# Patient Record
Sex: FEMALE | Race: WHITE | Employment: FULL TIME | URBAN - METROPOLITAN AREA
[De-identification: names, ages, dates, MRNs, and addresses within clinical notes are randomized per-mention and may not be internally consistent; named-entity substitution may affect disease eponyms.]

---

## 2020-12-08 ENCOUNTER — THERAPY VISIT (OUTPATIENT)
Dept: PHYSICAL THERAPY | Facility: CLINIC | Age: 25
End: 2020-12-08
Payer: COMMERCIAL

## 2020-12-08 DIAGNOSIS — M54.2 CERVICALGIA: ICD-10-CM

## 2020-12-08 DIAGNOSIS — R68.84 JAW PAIN: ICD-10-CM

## 2020-12-08 PROCEDURE — 97112 NEUROMUSCULAR REEDUCATION: CPT | Mod: GP | Performed by: PHYSICAL THERAPIST

## 2020-12-08 PROCEDURE — 97161 PT EVAL LOW COMPLEX 20 MIN: CPT | Mod: GP | Performed by: PHYSICAL THERAPIST

## 2020-12-08 NOTE — PROGRESS NOTES
Physical Therapy Initial Evaluation  December 8, 2020     Precautions/Restrictions/MD instructions: PT eval and treat.    Subjective:   Date of Onset: 2014  C/C: bilateral jaw pain, right greater than left extending from her TMJ, masseter, digastrics. Lateral right sided neck pain extending to her shoulder. Clicking and locking with opening  Quality of pain is aching. Pains are described as constant in nature. Pain is worse: on the go. Pain is rated 4/10.   History of symptoms: Pains began gradually as the result of unknown origins. Likely due to stress, braces and teeth clenching. Since onset, symptoms are improving. 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 She denies night pain,  significant current illness or recent hospital admission. She denies any regional implanted devices. She denies history of surgery in the area other than wisdom teeth removal in 2015 and cranial spenoid surgery  Regular exercise routine: varies- yoga, spin classes, kickboxing  Imaging: x-ray. Current occupational status: . Patient's goals are: decrease pain, improve tolerance to eating tough foods, reduce muscle tension, improve ability to open her mouth, improve tolerance to computer work and drawing. Return to MD:  PRN.       Objective:    Posture:   Facial Symmetry:   TMJ AROM:   Movement Degrees Joint noise Deviation Pain   Opening 25 clicking s-curve Present at right jaw   Left Lateral deviation  5      Right Lateral Deviation 5      Joint mobility:  Movement Movement Pain   Distraction WNL soreness and  clicking on the right     Cervical AROM: (Major, Moderate, Minimal or Nil loss)  Movement Loss Javier Mod Min Nil Pain   Protrusion   x     Flexion   x     Retraction   x     Extension   x     Left Rotation  x      Right Rotation    x    Left Side Bending   x     Right Side bending  x      Facial symmetry: Right sided lipoma above her zygomatic arch    Beighton scale: 5/9    Assessment/Plan:    The patient is a 25 year old female with chief complaint of jaw, neck and upper back pain.    The patient has the following significant findings with corresponding treatment plan.  Diagnosis 1:  TMJ, neck pain    Pain -  hot/cold therapy, manual therapy, splint/taping/bracing/orthotics, education, directional preference exercise and home program  Decreased ROM/flexibility - manual therapy and therapeutic exercise  Decreased strength - therapeutic exercise and therapeutic activities  Impaired balance - neuro re-education and therapeutic activities  Decreased proprioception - neuro re-education and therapeutic activities  Impaired muscle performance - neuro re-education  Decreased function - therapeutic activities  Impaired posture - neuro re-education        Therapy Evaluation Codes:   1) History comprised of:   Personal factors that impact the plan of care:      Anxiety, Overall behavior pattern and Time since onset of symptoms.    Comorbidity factors that impact the plan of care are:      Depression, Migraines/headaches and history of facial tumor and removal.     Medications impacting care: Anti-depressant, Anti-inflammatory, High blood pressure, Muscle relaxant and Pain.  2) Examination of Body Systems comprised of:   Body structures and functions that impact the plan of care:      Cervical spine, Head, Knee, Lumbar spine and Thoracic Spine.   Activity limitations that impact the plan of care are:      Cooking, Lifting, Reading/Computer work, Sitting, Working, Sleeping and chewing, yawning, talking, oral hygine .   Clinical  presentation characteristics are:    Stable/Uncomplicated.  3) Presentation comprised of:   Presentation scored as Low complexity with uncomplicated characteristics..  4) Decision-Making    Low complexity using standardized patient assessment instrument and/or measureable assessment of functional outcome.  Cumulative Therapy Evaluation is: Low complexity.    Previous and current functional limitations:  (See Goal Flow Sheet for this information)    Short term and Long term goals: (See Goal Flow Sheet for this information)     Communication ability:  Patient appears to be able to clearly communicate and understand verbal and written communication and follow directions correctly.  Treatment Explanation - The following has been discussed with the patient: RX ordered/plan of care, anticipated outcomes, and possible risks and side effects.  This patient would benefit from PT intervention to resume normal activities.   Rehab potential is good.    Frequency:  1 X week, once daily  Duration:  for 8 weeks  Discharge Plan: Achieve all LTGs, be independent in home treatment program, and reach maximal therapeutic benefit.    Please refer to the daily flowsheet for treatment today, total treatment time and time spent performing 1:1 timed codes.

## 2020-12-08 NOTE — LETTER
[facility] PHYSICAL THERAPY  [address] [place] [address]  [phone]    December 10, 2020    Re: Elisha Branham   :   1995  MRN:  [id number]   REFERRING PHYSICIAN:   Aneesh Quinones    [facility]    Date of Initial Evaluation:  20  Visits:  Rxs Used: 1  Reason for Referral:     Jaw pain  Cervicalgia        Physical Therapy Initial Evaluation  2020     Precautions/Restrictions/MD instructions: PT eval and treat.    Subjective:   Date of Onset:   C/C: bilateral jaw pain, right greater than left extending from her TMJ, masseter, digastrics. Lateral right sided neck pain extending to her shoulder. Clicking and locking with opening  Quality of pain is aching. Pains are described as constant in nature. Pain is worse: on the go. Pain is rated 4/10.   History of symptoms: Pains began gradually as the result of unknown origins. Likely due to stress, braces and teeth clenching. Since onset, symptoms are improving. 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 She denies night pain,  significant current illness or recent hospital admission. She denies any regional implanted devices. She denies history of surgery in the area other than wisdom teeth removal in  and cranial spenoid surgery  Regular exercise routine: varies- yoga, spin classes, kickboxing  Imaging: x-ray. Current occupational status: .  Patient's goals are: decrease pain, improve tolerance to eating tough foods, reduce muscle tension, improve ability to open her mouth, improve tolerance to computer work and drawing. Return to MD:  PRN.       Objective:    Posture:   Facial Symmetry:   TMJ AROM:   Movement Degrees Joint noise Deviation Pain   Opening 25 clicking s-curve Present at right jaw   Left Lateral deviation  5      Right Lateral Deviation 5        Joint mobility:  Movement Movement Pain   Distraction WNL soreness and clicking on the right     Cervical AROM: (Major, Moderate, Minimal or Nil loss)  Movement Loss Javier Mod Min Nil Pain   Protrusion   x     Flexion   x     Retraction   x     Extension   x     Left Rotation  x      Right Rotation    x    Left Side Bending   x     Right Side bending  x      Facial symmetry: Right sided lipoma above her zygomatic arch    Beighton scale:             Re: Elisha Branham   :   1995        Assessment/Plan:    The patient is a 25 year old female with chief complaint of jaw, neck and upper back pain.    The patient has the following significant findings with corresponding treatment plan.  Diagnosis 1:  TMJ, neck pain    Pain -  hot/cold therapy, manual therapy, splint/taping/bracing/orthotics, education, directional preference exercise and home program  Decreased ROM/flexibility - manual therapy and therapeutic exercise  Decreased strength - therapeutic exercise and therapeutic activities  Impaired balance - neuro re-education and therapeutic activities  Decreased proprioception - neuro re-education and therapeutic activities  Impaired muscle performance - neuro re-education  Decreased function - therapeutic activities  Impaired posture - neuro re-education      Therapy Evaluation Codes:   1) History comprised of:   Personal factors that impact the plan of care:      Anxiety, Overall behavior pattern and Time since onset of symptoms.    Comorbidity factors that impact the plan of care are:       Depression, Migraines/headaches and history of facial tumor and  removal.     Medications impacting care: Anti-depressant, Anti-inflammatory, High blood  pressure, Muscle relaxant and Pain.  2) Examination of Body Systems comprised of:   Body structures and functions that impact the plan of care:      Cervical spine, Head, Knee, Lumbar spine and Thoracic Spine.   Activity limitations that impact the plan of care are:      Cooking, Lifting, Reading/Computer work, Sitting, Working, Sleeping and  chewing, yawning, talking, oral hygine .   Clinical presentation characteristics are:    Stable/Uncomplicated.  3) Presentation comprised of:   Presentation scored as Low complexity with uncomplicated characteristics..  4) Decision-Making    Low complexity using standardized patient assessment instrument and/or  measureable assessment of functional outcome.      Cumulative Therapy Evaluation is: Low complexity.    Previous and current functional limitations:  (See Goal Flow Sheet for this information)    Short term and Long term goals: (See Goal Flow Sheet for this information)     Communication ability:  Patient appears to be able to clearly communicate and understand verbal and written communication and follow directions correctly.    Re: Elisha Branham   :   1995          Treatment Explanation - The following has been discussed with the patient: RX ordered/plan of care, anticipated outcomes, and possible risks and side effects.  This patient would benefit from PT intervention to resume normal activities.   Rehab potential is good.    Frequency:  1 X week, once daily  Duration:  for 8 weeks  Discharge Plan: Achieve all LTGs, be independent in home treatment program, and reach maximal therapeutic benefit.    Please refer to the daily flowsheet for treatment today, total treatment time and time spent performing 1:1 timed codes.         Thank you for your referral.    INQUIRIES  Therapist: Stephania Adair  DPT  [facility] PHYSICAL THERAPY [address] [place] [address]  Phone: [phone]  Fax: [phone]

## 2020-12-18 ENCOUNTER — THERAPY VISIT (OUTPATIENT)
Dept: PHYSICAL THERAPY | Facility: CLINIC | Age: 25
End: 2020-12-18
Payer: COMMERCIAL

## 2020-12-18 DIAGNOSIS — M54.2 CERVICALGIA: ICD-10-CM

## 2020-12-18 DIAGNOSIS — R68.84 JAW PAIN: ICD-10-CM

## 2020-12-18 PROCEDURE — 97140 MANUAL THERAPY 1/> REGIONS: CPT | Mod: GP | Performed by: PHYSICAL THERAPIST

## 2020-12-18 PROCEDURE — 97112 NEUROMUSCULAR REEDUCATION: CPT | Mod: GP | Performed by: PHYSICAL THERAPIST

## 2020-12-27 ENCOUNTER — HEALTH MAINTENANCE LETTER (OUTPATIENT)
Age: 25
End: 2020-12-27

## 2021-01-08 ENCOUNTER — THERAPY VISIT (OUTPATIENT)
Dept: PHYSICAL THERAPY | Facility: CLINIC | Age: 26
End: 2021-01-08
Payer: COMMERCIAL

## 2021-01-08 DIAGNOSIS — M54.2 CERVICALGIA: ICD-10-CM

## 2021-01-08 DIAGNOSIS — R68.84 JAW PAIN: ICD-10-CM

## 2021-01-08 PROCEDURE — 97112 NEUROMUSCULAR REEDUCATION: CPT | Mod: GP | Performed by: PHYSICAL THERAPIST

## 2021-01-08 PROCEDURE — 97140 MANUAL THERAPY 1/> REGIONS: CPT | Mod: GP | Performed by: PHYSICAL THERAPIST

## 2021-01-15 ENCOUNTER — THERAPY VISIT (OUTPATIENT)
Dept: PHYSICAL THERAPY | Facility: CLINIC | Age: 26
End: 2021-01-15
Payer: COMMERCIAL

## 2021-01-15 DIAGNOSIS — R68.84 JAW PAIN: ICD-10-CM

## 2021-01-15 DIAGNOSIS — M54.2 CERVICALGIA: ICD-10-CM

## 2021-01-15 PROCEDURE — 97140 MANUAL THERAPY 1/> REGIONS: CPT | Mod: GP | Performed by: PHYSICAL THERAPIST

## 2021-01-15 PROCEDURE — 97112 NEUROMUSCULAR REEDUCATION: CPT | Mod: GP | Performed by: PHYSICAL THERAPIST

## 2021-01-22 ENCOUNTER — THERAPY VISIT (OUTPATIENT)
Dept: PHYSICAL THERAPY | Facility: CLINIC | Age: 26
End: 2021-01-22
Payer: COMMERCIAL

## 2021-01-22 DIAGNOSIS — R68.84 JAW PAIN: ICD-10-CM

## 2021-01-22 DIAGNOSIS — M54.2 CERVICALGIA: ICD-10-CM

## 2021-01-22 PROCEDURE — 97140 MANUAL THERAPY 1/> REGIONS: CPT | Mod: GP | Performed by: PHYSICAL THERAPIST

## 2021-01-22 PROCEDURE — 97112 NEUROMUSCULAR REEDUCATION: CPT | Mod: GP | Performed by: PHYSICAL THERAPIST

## 2021-01-29 ENCOUNTER — THERAPY VISIT (OUTPATIENT)
Dept: PHYSICAL THERAPY | Facility: CLINIC | Age: 26
End: 2021-01-29
Payer: COMMERCIAL

## 2021-01-29 DIAGNOSIS — R68.84 JAW PAIN: Primary | ICD-10-CM

## 2021-01-29 DIAGNOSIS — M54.2 CERVICALGIA: ICD-10-CM

## 2021-01-29 PROCEDURE — 97140 MANUAL THERAPY 1/> REGIONS: CPT | Mod: GP | Performed by: PHYSICAL THERAPIST

## 2021-01-29 PROCEDURE — 97112 NEUROMUSCULAR REEDUCATION: CPT | Mod: GP | Performed by: PHYSICAL THERAPIST

## 2021-02-05 ENCOUNTER — THERAPY VISIT (OUTPATIENT)
Dept: PHYSICAL THERAPY | Facility: CLINIC | Age: 26
End: 2021-02-05
Payer: COMMERCIAL

## 2021-02-05 DIAGNOSIS — M54.2 CERVICALGIA: ICD-10-CM

## 2021-02-05 DIAGNOSIS — R68.84 JAW PAIN: ICD-10-CM

## 2021-02-05 PROCEDURE — 97140 MANUAL THERAPY 1/> REGIONS: CPT | Mod: GP | Performed by: PHYSICAL THERAPIST

## 2021-02-05 PROCEDURE — 97112 NEUROMUSCULAR REEDUCATION: CPT | Mod: GP | Performed by: PHYSICAL THERAPIST

## 2021-02-12 ENCOUNTER — THERAPY VISIT (OUTPATIENT)
Dept: PHYSICAL THERAPY | Facility: CLINIC | Age: 26
End: 2021-02-12
Payer: COMMERCIAL

## 2021-02-12 DIAGNOSIS — R68.84 JAW PAIN: ICD-10-CM

## 2021-02-12 DIAGNOSIS — M54.2 CERVICALGIA: ICD-10-CM

## 2021-02-12 PROCEDURE — 97140 MANUAL THERAPY 1/> REGIONS: CPT | Mod: GP | Performed by: PHYSICAL THERAPIST

## 2021-02-12 PROCEDURE — 97112 NEUROMUSCULAR REEDUCATION: CPT | Mod: GP | Performed by: PHYSICAL THERAPIST

## 2021-02-19 ENCOUNTER — THERAPY VISIT (OUTPATIENT)
Dept: PHYSICAL THERAPY | Facility: CLINIC | Age: 26
End: 2021-02-19
Payer: COMMERCIAL

## 2021-02-19 DIAGNOSIS — M54.2 CERVICALGIA: ICD-10-CM

## 2021-02-19 DIAGNOSIS — R68.84 JAW PAIN: ICD-10-CM

## 2021-02-19 PROCEDURE — 97140 MANUAL THERAPY 1/> REGIONS: CPT | Mod: GP | Performed by: PHYSICAL THERAPIST

## 2021-02-19 PROCEDURE — 97112 NEUROMUSCULAR REEDUCATION: CPT | Mod: GP | Performed by: PHYSICAL THERAPIST

## 2021-03-05 ENCOUNTER — THERAPY VISIT (OUTPATIENT)
Dept: PHYSICAL THERAPY | Facility: CLINIC | Age: 26
End: 2021-03-05
Payer: COMMERCIAL

## 2021-03-05 DIAGNOSIS — R68.84 JAW PAIN: ICD-10-CM

## 2021-03-05 DIAGNOSIS — M54.2 CERVICALGIA: ICD-10-CM

## 2021-03-05 PROCEDURE — 97140 MANUAL THERAPY 1/> REGIONS: CPT | Mod: GP | Performed by: PHYSICAL THERAPIST

## 2021-03-05 PROCEDURE — 97112 NEUROMUSCULAR REEDUCATION: CPT | Mod: GP | Performed by: PHYSICAL THERAPIST

## 2021-03-12 ENCOUNTER — THERAPY VISIT (OUTPATIENT)
Dept: PHYSICAL THERAPY | Facility: CLINIC | Age: 26
End: 2021-03-12
Payer: COMMERCIAL

## 2021-03-12 DIAGNOSIS — M54.2 CERVICALGIA: ICD-10-CM

## 2021-03-12 DIAGNOSIS — R68.84 JAW PAIN: ICD-10-CM

## 2021-03-12 PROCEDURE — 97112 NEUROMUSCULAR REEDUCATION: CPT | Mod: GP | Performed by: PHYSICAL THERAPIST

## 2021-03-12 PROCEDURE — 97110 THERAPEUTIC EXERCISES: CPT | Mod: GP | Performed by: PHYSICAL THERAPIST

## 2021-03-12 PROCEDURE — 97140 MANUAL THERAPY 1/> REGIONS: CPT | Mod: GP | Performed by: PHYSICAL THERAPIST

## 2021-03-12 NOTE — PROGRESS NOTES
PROGRESS  REPORT    Progress reporting period is from 12/8/20 to 3/12/21.       SUBJECTIVE  Subjective changes noted by patient: Silvia reports she she feels a 50-60% improvement in function. She is eating more easily but still has moderate difficulty eating crunchy breads. She is getting headaches 1-4 days per week, averaging 2. She experiences more symptoms when she is stressed.  Current pain level is 0/10 in her jaw, Headache 4-7/10 due to stress at work.     Previous pain level was   8/10.   Changes in function:  Yes (See Goal flowsheet attached for changes in current functional level)  Adverse reaction to treatment or activity: None    OBJECTIVE  Changes noted in objective findings:  Yes,     TMJ AROM:   Movement Degrees Joint noise Deviation Pain   Opening 55 none s-curve Present at right jaw   Left Lateral deviation  12         Right Lateral Deviation 10         Joint mobility:  Movement Movement Pain   Distraction WNL WNL      Cervical AROM: (Major, Moderate, Minimal or Nil loss)  Movement Loss Javier Mod Min Nil Pain   Protrusion     x       Flexion      x  Stretching     Retraction     x   Min stretching anterior neck     Extension      x      Left Rotation    x        Right Rotation       x Left sided tightness     Left Side Bending     x       Right Side bending     x       Facial symmetry: Right sided lipoma above her zygomatic arch - 50% reduction in density, smoother edges.         ASSESSMENT/PLAN  Updated problem list and treatment plan: Diagnosis 1:  TMJ, neck pain    Pain -  hot/cold therapy, manual therapy, splint/taping/bracing/orthotics, education, directional preference exercise and home program  Decreased ROM/flexibility - manual therapy and therapeutic exercise  Decreased strength - therapeutic exercise and therapeutic activities  Impaired balance - neuro re-education and therapeutic activities  Decreased proprioception - neuro re-education and therapeutic activities  Impaired muscle performance -  neuro re-education  Decreased function - therapeutic activities  Impaired posture - neuro re-education  STG/LTGs have been met or progress has been made towards goals:  Yes (See Goal flow sheet completed today.)  Assessment of Progress: The patient's condition is improving.  Self Management Plans:  Patient has been instructed in a home treatment program.  I have re-evaluated this patient and find that the nature, scope, duration and intensity of the therapy is appropriate for the medical condition of the patient.  Elisha continues to require the following intervention to meet STG and LTG's:  PT    Recommendations:  This patient would benefit from continued therapy.     Frequency:  1 X week, once daily  Duration:  for 3 weeks tapering to 2 X a month over 2 months        Please refer to the daily flowsheet for treatment today, total treatment time and time spent performing 1:1 timed codes.

## 2021-03-19 ENCOUNTER — THERAPY VISIT (OUTPATIENT)
Dept: PHYSICAL THERAPY | Facility: CLINIC | Age: 26
End: 2021-03-19
Payer: COMMERCIAL

## 2021-03-19 DIAGNOSIS — M54.2 CERVICALGIA: ICD-10-CM

## 2021-03-19 DIAGNOSIS — R68.84 JAW PAIN: ICD-10-CM

## 2021-03-19 PROCEDURE — 97140 MANUAL THERAPY 1/> REGIONS: CPT | Mod: GP | Performed by: PHYSICAL THERAPIST

## 2021-03-19 PROCEDURE — 97110 THERAPEUTIC EXERCISES: CPT | Mod: GP | Performed by: PHYSICAL THERAPIST

## 2021-03-19 PROCEDURE — 97112 NEUROMUSCULAR REEDUCATION: CPT | Mod: GP | Performed by: PHYSICAL THERAPIST

## 2021-03-26 ENCOUNTER — THERAPY VISIT (OUTPATIENT)
Dept: PHYSICAL THERAPY | Facility: CLINIC | Age: 26
End: 2021-03-26
Payer: COMMERCIAL

## 2021-03-26 DIAGNOSIS — R68.84 JAW PAIN: ICD-10-CM

## 2021-03-26 DIAGNOSIS — M54.2 CERVICALGIA: ICD-10-CM

## 2021-03-26 PROCEDURE — 97140 MANUAL THERAPY 1/> REGIONS: CPT | Mod: GP | Performed by: PHYSICAL THERAPIST

## 2021-03-26 PROCEDURE — 97112 NEUROMUSCULAR REEDUCATION: CPT | Mod: GP | Performed by: PHYSICAL THERAPIST

## 2021-03-26 PROCEDURE — 97110 THERAPEUTIC EXERCISES: CPT | Mod: GP | Performed by: PHYSICAL THERAPIST

## 2021-04-09 ENCOUNTER — THERAPY VISIT (OUTPATIENT)
Dept: PHYSICAL THERAPY | Facility: CLINIC | Age: 26
End: 2021-04-09
Payer: COMMERCIAL

## 2021-04-09 DIAGNOSIS — R68.84 JAW PAIN: ICD-10-CM

## 2021-04-09 DIAGNOSIS — M54.2 CERVICALGIA: ICD-10-CM

## 2021-04-09 PROCEDURE — 97112 NEUROMUSCULAR REEDUCATION: CPT | Mod: GP | Performed by: PHYSICAL THERAPIST

## 2021-04-09 PROCEDURE — 97110 THERAPEUTIC EXERCISES: CPT | Mod: GP | Performed by: PHYSICAL THERAPIST

## 2021-04-09 PROCEDURE — 97535 SELF CARE MNGMENT TRAINING: CPT | Mod: GP | Performed by: PHYSICAL THERAPIST

## 2021-04-23 ENCOUNTER — THERAPY VISIT (OUTPATIENT)
Dept: PHYSICAL THERAPY | Facility: CLINIC | Age: 26
End: 2021-04-23
Payer: COMMERCIAL

## 2021-04-23 DIAGNOSIS — R68.84 JAW PAIN: ICD-10-CM

## 2021-04-23 DIAGNOSIS — M54.2 CERVICALGIA: ICD-10-CM

## 2021-04-23 PROCEDURE — 97112 NEUROMUSCULAR REEDUCATION: CPT | Mod: GP | Performed by: PHYSICAL THERAPIST

## 2021-04-23 PROCEDURE — 97140 MANUAL THERAPY 1/> REGIONS: CPT | Mod: GP | Performed by: PHYSICAL THERAPIST

## 2021-04-23 PROCEDURE — 97110 THERAPEUTIC EXERCISES: CPT | Mod: GP | Performed by: PHYSICAL THERAPIST

## 2021-05-07 ENCOUNTER — THERAPY VISIT (OUTPATIENT)
Dept: PHYSICAL THERAPY | Facility: CLINIC | Age: 26
End: 2021-05-07
Payer: COMMERCIAL

## 2021-05-07 DIAGNOSIS — M54.2 CERVICALGIA: ICD-10-CM

## 2021-05-07 DIAGNOSIS — R68.84 JAW PAIN: ICD-10-CM

## 2021-05-07 PROCEDURE — 97140 MANUAL THERAPY 1/> REGIONS: CPT | Mod: GP | Performed by: PHYSICAL THERAPIST

## 2021-05-07 PROCEDURE — 97112 NEUROMUSCULAR REEDUCATION: CPT | Mod: GP | Performed by: PHYSICAL THERAPIST

## 2021-05-07 PROCEDURE — 97110 THERAPEUTIC EXERCISES: CPT | Mod: GP | Performed by: PHYSICAL THERAPIST

## 2021-05-21 ENCOUNTER — THERAPY VISIT (OUTPATIENT)
Dept: PHYSICAL THERAPY | Facility: CLINIC | Age: 26
End: 2021-05-21
Payer: COMMERCIAL

## 2021-05-21 DIAGNOSIS — R68.84 JAW PAIN: ICD-10-CM

## 2021-05-21 DIAGNOSIS — M54.2 CERVICALGIA: ICD-10-CM

## 2021-05-21 PROCEDURE — 97140 MANUAL THERAPY 1/> REGIONS: CPT | Mod: GP | Performed by: PHYSICAL THERAPIST

## 2021-05-21 PROCEDURE — 97112 NEUROMUSCULAR REEDUCATION: CPT | Mod: GP | Performed by: PHYSICAL THERAPIST

## 2021-06-04 ENCOUNTER — THERAPY VISIT (OUTPATIENT)
Dept: PHYSICAL THERAPY | Facility: CLINIC | Age: 26
End: 2021-06-04
Payer: COMMERCIAL

## 2021-06-04 DIAGNOSIS — R68.84 JAW PAIN: ICD-10-CM

## 2021-06-04 DIAGNOSIS — M54.2 CERVICALGIA: ICD-10-CM

## 2021-06-04 PROCEDURE — 97140 MANUAL THERAPY 1/> REGIONS: CPT | Mod: GP | Performed by: PHYSICAL THERAPIST

## 2021-06-04 PROCEDURE — 97112 NEUROMUSCULAR REEDUCATION: CPT | Mod: GP | Performed by: PHYSICAL THERAPIST

## 2021-07-02 ENCOUNTER — THERAPY VISIT (OUTPATIENT)
Dept: PHYSICAL THERAPY | Facility: CLINIC | Age: 26
End: 2021-07-02
Payer: COMMERCIAL

## 2021-07-02 DIAGNOSIS — M54.2 CERVICALGIA: ICD-10-CM

## 2021-07-02 DIAGNOSIS — R68.84 JAW PAIN: ICD-10-CM

## 2021-07-02 PROCEDURE — 97110 THERAPEUTIC EXERCISES: CPT | Mod: GP | Performed by: PHYSICAL THERAPIST

## 2021-07-02 PROCEDURE — 97112 NEUROMUSCULAR REEDUCATION: CPT | Mod: GP | Performed by: PHYSICAL THERAPIST

## 2021-07-02 PROCEDURE — 97140 MANUAL THERAPY 1/> REGIONS: CPT | Mod: GP | Performed by: PHYSICAL THERAPIST

## 2021-07-09 ENCOUNTER — OFFICE VISIT (OUTPATIENT)
Dept: URGENT CARE | Facility: URGENT CARE | Age: 26
End: 2021-07-09
Payer: COMMERCIAL

## 2021-07-09 ENCOUNTER — ANCILLARY PROCEDURE (OUTPATIENT)
Dept: GENERAL RADIOLOGY | Facility: CLINIC | Age: 26
End: 2021-07-09
Attending: INTERNAL MEDICINE
Payer: COMMERCIAL

## 2021-07-09 ENCOUNTER — TELEPHONE (OUTPATIENT)
Dept: FAMILY MEDICINE | Facility: CLINIC | Age: 26
End: 2021-07-09

## 2021-07-09 VITALS
TEMPERATURE: 98.8 F | HEART RATE: 60 BPM | BODY MASS INDEX: 25.76 KG/M2 | RESPIRATION RATE: 16 BRPM | HEIGHT: 68 IN | WEIGHT: 170 LBS | DIASTOLIC BLOOD PRESSURE: 70 MMHG | SYSTOLIC BLOOD PRESSURE: 104 MMHG

## 2021-07-09 DIAGNOSIS — M54.6 ACUTE MIDLINE THORACIC BACK PAIN: Primary | ICD-10-CM

## 2021-07-09 DIAGNOSIS — M54.2 NECK PAIN: ICD-10-CM

## 2021-07-09 DIAGNOSIS — M54.50 ACUTE BILATERAL LOW BACK PAIN WITHOUT SCIATICA: ICD-10-CM

## 2021-07-09 DIAGNOSIS — M62.830 SPASM OF BACK MUSCLES: ICD-10-CM
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
## 2021-07-09 PROCEDURE — 99204 OFFICE O/P NEW MOD 45 MIN: CPT | Performed by: INTERNAL MEDICINE

## 2021-07-09 PROCEDURE — 72070 X-RAY EXAM THORAC SPINE 2VWS: CPT | Performed by: RADIOLOGY

## 2021-07-09 RX ORDER — ESCITALOPRAM OXALATE 20 MG/1
TABLET ORAL
COMMUNITY
Start: 2021-05-21

## 2021-07-09 RX ORDER — SPIRONOLACTONE 100 MG/1
100 TABLET, FILM COATED ORAL DAILY
COMMUNITY

## 2021-07-09 RX ORDER — NAPROXEN 500 MG/1
500 TABLET ORAL 2 TIMES DAILY WITH MEALS
Qty: 28 TABLET | Refills: 0 | Status: SHIPPED | OUTPATIENT
Start: 2021-07-09 | End: 2021-07-23

## 2021-07-09 RX ORDER — CYCLOBENZAPRINE HCL 10 MG
10 TABLET ORAL 3 TIMES DAILY PRN
Qty: 20 TABLET | Refills: 0 | Status: SHIPPED | OUTPATIENT
Start: 2021-07-09 | End: 2021-07-16

## 2021-07-09 ASSESSMENT — MIFFLIN-ST. JEOR: SCORE: 1559.61

## 2021-07-09 NOTE — TELEPHONE ENCOUNTER
Fell down the stairs yesterday.  No LOC  Has a lot of pain today that she is not sure if it is muscular or bone pain.  Pain is decreasing as the day goes on, better this afternoon than it was this morning or last night.  Took a warm bath and that loosened things up.  Has not been seen at the clinic but has done PT at the [facility] in the building.  Patient has a history of having brain surgery 2 years ago and is concerned she may have done something though she denies hitting her head.  Patient will come to Urgent Care for evaluation.  Negar Ibrahim RN  [facility]

## 2021-07-09 NOTE — PATIENT INSTRUCTIONS
X-ray shows no obvious fracture  Radiology review pending    Please take naproxen 500 mg twice daily with food  May take muscle relaxant that you have at home   otherwise I did send a prescription for Flexeril 10 mg up to 3 times a day as needed    Ice to area 10 to 20 minutes 4 times a day    Rest  No heavy activities    If symptoms are changing or pain worsens you may go to the emergency room for evaluation.      Otherwise I would like you to have close follow-up with recheck next week.  May need Physical Therapy again

## 2021-07-09 NOTE — PROGRESS NOTES
ASSESSMENT AND PLAN:      ICD-10-CM    1. Acute midline thoracic back pain  M54.6 naproxen (NAPROSYN) 500 MG tablet     cyclobenzaprine (FLEXERIL) 10 MG tablet     XR Thoracic Spine 2 Views   2. Fall down stairs, initial encounter  W10.8XXA    3. Neck pain  M54.2    4. Acute bilateral low back pain without sciatica  M54.5    5. Spasm of back muscles  M62.830      Differential Diagnosis:  MS Injury Pain: sprain, fracture and contusion    PLAN:    May need repeat xray for fracture eval or CT imaging      Patient Instructions       X-ray shows no obvious fracture  Radiology review pending    Please take naproxen 500 mg twice daily with food  May take muscle relaxant that you have at home   otherwise I did send a prescription for Flexeril 10 mg up to 3 times a day as needed    Ice to area 10 to 20 minutes 4 times a day    Rest  No heavy activities    If symptoms are changing or pain worsens you may go to the emergency room for evaluation.      Otherwise I would like you to have close follow-up with recheck next week.  May need Physical Therapy again      No follow-ups on file.        Renee Navarrete MD  [facility] URGENT [facility]ARE    Subjective     Elisha Branham is a 26 year old who presents for Patient presents with:  Urgent Care  Musculoskeletal Problem: fell down stairs last night, very steep. 12 steps. Back and neck pain.     a new patient of [facility].    MS Injury/Pain    Onset of symptoms was last night  Context:       The injury happened while at home      Mechanism: fall ,   Fell down 12 steps last night  Slipped on wood steps and fell on back & hit every step with back of neck, upper back, bum      Patient experienced immediate pain    Current and Associated symptoms: Pain and Decreased range of motion  Denies numbness paresthesias weakness of hands  Aggravating Factors: twisting  Therapies to improve symptoms include: ibuprofen and hot water bath    She has a history of neck pain with reduced  "range of motion and just finished physical therapy for this last week  Now her neck feels stiff again    Hx mild concussion in past        Objective    /70   Pulse 60   Temp 98.8  F (37.1  C) (Oral)   Resp 16   Ht 1.727 m (5' 8\")   Wt 77.1 kg (170 lb)   LMP 07/02/2021   Breastfeeding No   BMI 25.85 kg/m    Physical Exam  Vitals signs reviewed.   Constitutional:       General: She is not in acute distress.     Appearance: Normal appearance. She is not ill-appearing, toxic-appearing or diaphoretic.   Musculoskeletal:      Comments: Full spine palpated    Palpation with guarding and   Pain over mid thoracic area just above bra line  With some swelling & ecchymosis    Also ecchymosis scattered near tail bone    Muscle spasm / pain noted with palpation of paraspinal tenderness    decreased range of motion neck     Neurological:      Mental Status: She is alert.            Xray - Reviewed and interpreted by me.  No fracture       "

## 2021-07-16 ENCOUNTER — OFFICE VISIT (OUTPATIENT)
Dept: FAMILY MEDICINE | Facility: CLINIC | Age: 26
End: 2021-07-16
Payer: COMMERCIAL

## 2021-07-16 VITALS
RESPIRATION RATE: 17 BRPM | WEIGHT: 177 LBS | OXYGEN SATURATION: 97 % | SYSTOLIC BLOOD PRESSURE: 111 MMHG | BODY MASS INDEX: 26.91 KG/M2 | HEART RATE: 65 BPM | DIASTOLIC BLOOD PRESSURE: 77 MMHG | TEMPERATURE: 98.6 F

## 2021-07-16 DIAGNOSIS — M54.50 ACUTE BILATERAL LOW BACK PAIN WITHOUT SCIATICA: ICD-10-CM

## 2021-07-16 DIAGNOSIS — F41.9 ANXIETY: ICD-10-CM
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
## 2021-07-16 DIAGNOSIS — S30.0XXS: ICD-10-CM

## 2021-07-16 DIAGNOSIS — M54.6 ACUTE BILATERAL THORACIC BACK PAIN: Primary | ICD-10-CM

## 2021-07-16 DIAGNOSIS — R20.2 PARESTHESIA OF RIGHT ARM: ICD-10-CM

## 2021-07-16 DIAGNOSIS — M54.2 CERVICALGIA: ICD-10-CM

## 2021-07-16 PROBLEM — H05.20 EXOPHTHALMOS OF RIGHT EYE: Status: ACTIVE | Noted: 2018-02-15

## 2021-07-16 PROBLEM — H05.20 EXOPHTHALMOS OF RIGHT EYE: Status: RESOLVED | Noted: 2018-02-15 | Resolved: 2021-07-16

## 2021-07-16 PROCEDURE — 99214 OFFICE O/P EST MOD 30 MIN: CPT | Performed by: FAMILY MEDICINE

## 2021-07-16 RX ORDER — TIZANIDINE 2 MG/1
2 TABLET ORAL
COMMUNITY

## 2021-07-16 NOTE — PATIENT INSTRUCTIONS
Doing better  No ct neck needed currently   If numbness in right arm gets worse then consider PT, MRI referral to Ortho spine  Keep taking naproxen, tizanidien as needed  Avoid alcohol  Soaking hot tub  Icing bruises on left back and right buttock  Can refer to ortho and pt if not better   Continue routine care with PCP Janene, therapy and regular primary  Continue care with [place] for hx of dysplasia as needed    Continue with being active  and modify activity that causes more pain   Ice then heat 20 min twice a day   Tylenol as needed  Over the counter pain creams to area  Follow up with primary if symptoms persist  If get worse such that loose control of bladder , bowels or difficulty walking go to Urgent care/ ER

## 2021-07-16 NOTE — PROGRESS NOTES
"    Assessment & Plan     Acute bilateral thoracic back pain  Acute bilateral low back pain without sciatica  Cervicalgia  Paresthesia of right arm  Traumatic ecchymosis of buttock, sequela  Fall down stairs, sequela  Anxiety  Doing better  No CT neck needed currently   If numbness in right arm gets worse then consider PT, MRI & referral to Ortho spine  Keep taking naproxen, tizanidine as needed  Avoid alcohol on muscle relaxants  Soaking hot tub & Icing bruises on left back and right buttock will help muscular stiffness and pains.   Can refer to ortho and pt if not better   Continue with being active  and modify activity that causes more pain   Ice then heat 20 min twice a day  Tylenol as needed  Over the counter pain creams to area  Follow up with primary if symptoms persist  If gets worse such that loose control of bladder , bowels or difficulty walking go to Urgent care/ ER   Anxiety stable on meds and in therapy. Reassured with plan in place.   Continue routine care with PCP Janene, therapy and regular primary  Continue care with [place] for hx of dysplasia as needed    30 minutes spent on the date of the encounter doing chart review, history and exam, documentation and further activities per the note     BMI:   Estimated body mass index is 26.91 kg/m  as calculated from the following:    Height as of 7/9/21: 1.727 m (5' 8\").    Weight as of this encounter: 80.3 kg (177 lb).   Weight management plan: Patient was referred to their PCP to discuss a diet and exercise plan.    Regular exercise  See Patient Instructions    Return in about 4 weeks (around 8/13/2021), or if symptoms worsen or fail to improve, for Routine preventive, in person.    Halle Pitts MD  [facility]    Marcel Vega is a 26 year old who presents for the following health issues     HPI      Follow up:  Facility:  [facility]  Date of visit: 7/9/21  Reason for visit: Back Pain  Current Status: Back pain has improved, neck " pain from time to time still, range of mobility is better. Hurts to ride in a car still but getting better.     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   With hx of TMJ pain & cervicalgia on tizanidine managed by facial pain  specialist recently completed PT for this at [facility].     Seen in UC on 7/9 a day after fell down steps night before. Evaluated for neck& back pain, noted ecchymosis on low back & buttock. Xray t spine was normal & given naproxen & flexeril to use. Advised supportive care and follow up in 1 week for recheck.    Here for an UC follow up. Her PCP is out of this system. New to this provider.  Reg PCP : Janene Barajas  Facial pain specialist  Out of network  Previously on lexapro by a [facility] doct, renewed by psyche & now by her PCP/gyn, is seeing a therapist     Notes 8 days ago was going down stairs at night with no lighting. Went down two stairs and then slipped and hit her back on every step as she slid down to the bottom. No hx of domestic violence. Feels occurred as stairs were sharp and was in the dark and missed her step.   Seen next day in UC ( about 1 week ago now).   Feels overall is feeling really good  Has a bigger range of motion than when she fell  Still feels neck hurts time to time  If does hurt will take a naproxen and muscle relaxant at night & that helps.   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   Only other area that hurts is when riding in the car if there is shaking it feels as if her head is moving more & hurts  her neck at times  Has full range of motion with her neck. Doesnt hurt to move it. Has had some numbness down right arm ( is left handed) occurred now and then since fall but less over time. No dropping things. Did go roller blading with a helmet on, and fell on her right side since UC visit and numbness acted up briefly but now again better. Has hx of TMJ and cervicalgia and only had recently completed PT for this.   Is going this evening on a longer road trip of 2 hrs to a place in [place]. She will be a passenger in the car her friend is driving and wants to make sure she is good to do that too  Did drive yesterday and also has been able to run and done fine so far. Able to turn her head to make safe turns in the car.   Recalls her mother at age 30 had fallen and then injured back and couldn't move for a while and her uncle had a slipped disc so wants to make sure nothing like that happens.    Has had no fever or chills,  Mild headache, no vertigo, no  Dizziness but feels hard to keep eyes focused, no double or blurry vision, no facial pain, earache, sore throat, has been blowing her nose a lot more, No bleeding from nose or ears  But if may blow her nose hard it may dislodge some dried crusted blood. Has hx of a chronic post nasal drip, no trouble hearing, smelling, tasting or swallowing, no cough, had muscular chest pain on bending few days after fall but not since then. Mostly chest wall feels sore and needs to stretch area but has no chest pain, trouble breathing or palpitations, No abdominal pain, heart burn, reflux, nausea or vomiting or diarrhea or constipation, no blood in stools or black stools, has had bright red blood on wiping after a BM, but this is not new, has had no weight loss or night sweats. No dysuria, hematuria, frequency, urgency, hesitancy, incontinence, No pelvic complaints. No leg swelling, No rash.    S/p right scalp fibrodysplasia with cyst in 2018, seen by PT after that. Has a  deformity right temple area that was not considered concerning.     Review of Systems   Constitutional, HEENT, cardiovascular, pulmonary, GI, , musculoskeletal, neuro, skin, endocrine and psych systems are negative, except as otherwise noted.      Objective    /77 (BP Location: Right arm, Patient Position: Sitting, Cuff Size: Adult Regular)   Pulse 65   Temp 98.6  F (37  C) (Tympanic)   Resp 17   Wt 80.3 kg (177 lb)   LMP 07/02/2021   SpO2 97%   BMI 26.91 kg/m    Body mass index is 26.91 kg/m .  Physical Exam   GENERAL: healthy, alert, no distress and over weight  EYES: Eyes grossly normal to inspection, PERRL and conjunctivae and sclerae normal. Right temporal area has mild chronic soft tissue swelling  HENT: ear canals and TM's normal, nose and mouth without ulcers or lesions. No otorrhea, epistaxis, or hemotympanum, milton or racoon sign noted  NECK: no adenopathy, no asymmetry, masses, or scars and thyroid normal to palpation  RESP: lungs clear to auscultation - no rales, rhonchi or wheezes  CV: regular rate and rhythm, normal S1 S2, no S3 or S4, no murmur, click or rub, no peripheral edema and peripheral pulses strong  ABDOMEN: soft, nontender, no hepatosplenomegaly, no masses and bowel sounds normal  MS: no gross musculoskeletal defects noted, no edema  Cervical: No swelling, bruising, erythema atrophy or deformity.  No tenderness noted.  Range of motion of the cervical spine is full in all directions without focal deficit.  Strength is full. Distal motor and sensory exam is intact.  No point tenderness over spinous processes.  Thoracic & Lumbar spine: No swelling, erythema atrophy or deformity.  No tenderness noted on palpation of spinous processes except lower back, has an evolving 1 cm bruise left paraspinal area and a 5 cm evolving bruise right gluteal area, Range of motion of the lumbar spine is full in all directions without focal deficit.  Strength is full.  SLR negative bilaterally.   Distal motor and sensory exam is intact. Distal pulses intact. No sacroiliac tenderness bilaterally.  Great toe extension normal. Gait is normal.   SKIN: no suspicious lesions or rashes except for bruising as noted above  NEURO: Normal strength and tone, mentation intact and speech normal  PSYCH: mentation appears normal, affect normal/bright    Xray - Reviewed and interpreted by me.  Done 1 week ago t spine xray

## 2021-10-04 ENCOUNTER — HEALTH MAINTENANCE LETTER (OUTPATIENT)
Age: 26
End: 2021-10-04

## 2021-12-09 PROBLEM — M54.2 CERVICALGIA: Status: RESOLVED | Noted: 2020-12-08 | Resolved: 2021-12-09

## 2021-12-09 PROBLEM — R68.84 JAW PAIN: Status: RESOLVED | Noted: 2020-12-08 | Resolved: 2021-12-09

## 2021-12-09 NOTE — PROGRESS NOTES
"Discharge Note    Progress reporting period is from last progress note on   to Jul 2, 2021.    Elisha failed to follow up and current status is unknown.  Please see information below for last relevant information on current status.  Patient seen for 19 visits.    SUBJECTIVE  Subjective changes noted by patient:  Silvia reports she has been consistent in managing her stress levels. This has helped her jaw dramatically. She has been keeping up with exercises and regular walking. She reports a 70% improvement in jaw function and headaches overall  .  Current pain level is  .     Previous pain level was   .   Changes in function:  Yes (See Goal flowsheet attached for changes in current functional level)  Adverse reaction to treatment or activity: None    OBJECTIVE  Changes noted in objective findings: Full and pain free neck and jaw ROM. Minimal S-curve upon end range opening only  Deep neck flexor strength 34\" today (38\" is normal)     ASSESSMENT/PLAN  Diagnosis: TMJ   Updated problem list and treatment plan:   Pain - HEP  Decreased ROM/flexibility - HEP  Decreased function - HEP  Decreased strength - HEP  STG/LTGs have been met or progress has been made towards goals:  Yes, please see goal flowsheet for most current information  Assessment of Progress: current status is unknown.    Last current status:     Self Management Plans:  HEP  I have re-evaluated this patient and find that the nature, scope, duration and intensity of the therapy is appropriate for the medical condition of the patient.  Elisha continues to require the following intervention to meet STG and LTG's:  HEP.    Recommendations:  Discharge with current home program.  Patient to follow up with MD as needed.    Please refer to the daily flowsheet for treatment today, total treatment time and time spent performing 1:1 timed codes.    "

## 2022-01-23 ENCOUNTER — HEALTH MAINTENANCE LETTER (OUTPATIENT)
Age: 27
End: 2022-01-23

## 2022-09-11 ENCOUNTER — HEALTH MAINTENANCE LETTER (OUTPATIENT)
Age: 27
End: 2022-09-11

## 2023-04-30 ENCOUNTER — HEALTH MAINTENANCE LETTER (OUTPATIENT)
Age: 28
End: 2023-04-30

## 2024-05-04 ENCOUNTER — HEALTH MAINTENANCE LETTER (OUTPATIENT)
Age: 29
End: 2024-05-04

## 2025-02-05 ENCOUNTER — OFFICE VISIT (OUTPATIENT)
Dept: URGENT CARE | Facility: URGENT CARE | Age: 30
End: 2025-02-05
Payer: COMMERCIAL

## 2025-02-05 VITALS
BODY MASS INDEX: 28.28 KG/M2 | TEMPERATURE: 98.9 F | RESPIRATION RATE: 18 BRPM | DIASTOLIC BLOOD PRESSURE: 83 MMHG | WEIGHT: 186 LBS | OXYGEN SATURATION: 98 % | SYSTOLIC BLOOD PRESSURE: 126 MMHG | HEART RATE: 104 BPM

## 2025-02-05 DIAGNOSIS — R05.1 ACUTE COUGH: Primary | ICD-10-CM

## 2025-02-05 LAB
FLUAV AG SPEC QL IA: NEGATIVE
FLUBV AG SPEC QL IA: NEGATIVE

## 2025-02-05 PROCEDURE — 87804 INFLUENZA ASSAY W/OPTIC: CPT | Performed by: NURSE PRACTITIONER

## 2025-02-05 PROCEDURE — 99203 OFFICE O/P NEW LOW 30 MIN: CPT | Performed by: NURSE PRACTITIONER

## 2025-02-05 RX ORDER — TRAZODONE HYDROCHLORIDE 50 MG/1
50-150 TABLET ORAL
COMMUNITY
Start: 2024-12-16

## 2025-02-05 RX ORDER — BUDESONIDE AND FORMOTEROL FUMARATE DIHYDRATE 80; 4.5 UG/1; UG/1
AEROSOL RESPIRATORY (INHALATION)
COMMUNITY
Start: 2024-10-24

## 2025-02-05 RX ORDER — CYCLOSPORINE 0.5 MG/ML
EMULSION OPHTHALMIC
COMMUNITY
Start: 2022-02-25

## 2025-02-05 RX ORDER — FLUTICASONE PROPIONATE 50 MCG
2 SPRAY, SUSPENSION (ML) NASAL
COMMUNITY
Start: 2022-02-18

## 2025-02-05 RX ORDER — AZELASTINE 1 MG/ML
2 SPRAY, METERED NASAL
COMMUNITY
Start: 2024-10-24

## 2025-02-05 RX ORDER — HYDROXYZINE HYDROCHLORIDE 25 MG/1
25 TABLET, FILM COATED ORAL EVERY 8 HOURS PRN
COMMUNITY
Start: 2024-02-01

## 2025-02-05 RX ORDER — LISDEXAMFETAMINE DIMESYLATE 40 MG/1
40 CAPSULE ORAL
COMMUNITY
Start: 2025-01-27

## 2025-02-05 RX ORDER — BENZONATATE 200 MG/1
200 CAPSULE ORAL 3 TIMES DAILY PRN
Qty: 30 CAPSULE | Refills: 0 | Status: SHIPPED | OUTPATIENT
Start: 2025-02-05

## 2025-02-05 ASSESSMENT — PAIN SCALES - GENERAL: PAINLEVEL_OUTOF10: MODERATE PAIN (4)

## 2025-02-05 NOTE — PROGRESS NOTES
Assessment & Plan     Acute cough  **  - Influenza A & B Antigen - Clinic Collect  - benzonatate (TESSALON) 200 MG capsule  Dispense: 30 capsule; Refill: 0    Influenza test came back negative.  Patient is aware.     Options for chest x-ray discussed, patient declined.     Deep breathing exercises encouraged.  I discussed her current cough medicines along with her other medications today.  Can take Tessalon Perles.  Follow-up if needed within the next 2 to 5 days if worse.           No follow-ups on file.    ELSI Vazquez [facility]    Marcel Vega is a 29 year old female who presents to clinic today for the following health issues:  Chief Complaint   Patient presents with    Cough     x3days    Fever     Taking ibuprofen    Generalized Body Aches     Tested negative for Covid today     - three days, cough, decreased appetite, not sleeping well related to cough. Hacking like cough.  Can be worse at night.  No shortness of breath noted, did have some diarrhea but that is better now,       HPI          Review of Systems        Objective    /83   Pulse 104   Temp 98.9  F (37.2  C) (Oral)   Resp 18   Wt 84.4 kg (186 lb)   LMP 01/12/2025   SpO2 98%   BMI 28.28 kg/m    Physical Exam

## 2025-05-17 ENCOUNTER — HEALTH MAINTENANCE LETTER (OUTPATIENT)
Age: 30
End: 2025-05-17